# Patient Record
Sex: MALE | URBAN - METROPOLITAN AREA
[De-identification: names, ages, dates, MRNs, and addresses within clinical notes are randomized per-mention and may not be internally consistent; named-entity substitution may affect disease eponyms.]

---

## 2023-10-10 ENCOUNTER — APPOINTMENT (OUTPATIENT)
Dept: PRIMARY CARE | Facility: CLINIC | Age: 33
End: 2023-10-10
Payer: MEDICAID

## 2023-10-18 ENCOUNTER — OFFICE VISIT (OUTPATIENT)
Dept: PRIMARY CARE | Facility: CLINIC | Age: 33
End: 2023-10-18
Payer: MEDICAID

## 2023-10-18 VITALS — HEART RATE: 92 BPM | DIASTOLIC BLOOD PRESSURE: 52 MMHG | SYSTOLIC BLOOD PRESSURE: 96 MMHG

## 2023-10-18 DIAGNOSIS — Z79.4 TYPE 2 DIABETES MELLITUS WITHOUT COMPLICATION, WITH LONG-TERM CURRENT USE OF INSULIN (MULTI): ICD-10-CM

## 2023-10-18 DIAGNOSIS — Z00.00 HEALTH CARE MAINTENANCE: ICD-10-CM

## 2023-10-18 DIAGNOSIS — E44.1 MILD PROTEIN-CALORIE MALNUTRITION (MULTI): ICD-10-CM

## 2023-10-18 DIAGNOSIS — E11.9 TYPE 2 DIABETES MELLITUS WITHOUT COMPLICATION, WITHOUT LONG-TERM CURRENT USE OF INSULIN (MULTI): Primary | ICD-10-CM

## 2023-10-18 DIAGNOSIS — E11.9 TYPE 2 DIABETES MELLITUS WITHOUT COMPLICATION, WITH LONG-TERM CURRENT USE OF INSULIN (MULTI): ICD-10-CM

## 2023-10-18 PROCEDURE — 82570 ASSAY OF URINE CREATININE: CPT

## 2023-10-18 PROCEDURE — 87389 HIV-1 AG W/HIV-1&-2 AB AG IA: CPT

## 2023-10-18 PROCEDURE — 80349 CANNABINOIDS NATURAL: CPT

## 2023-10-18 PROCEDURE — 87800 DETECT AGNT MULT DNA DIREC: CPT

## 2023-10-18 PROCEDURE — 83036 HEMOGLOBIN GLYCOSYLATED A1C: CPT

## 2023-10-18 PROCEDURE — 86481 TB AG RESPONSE T-CELL SUSP: CPT

## 2023-10-18 PROCEDURE — 36415 COLL VENOUS BLD VENIPUNCTURE: CPT

## 2023-10-18 PROCEDURE — 99204 OFFICE O/P NEW MOD 45 MIN: CPT | Performed by: INTERNAL MEDICINE

## 2023-10-18 PROCEDURE — 80307 DRUG TEST PRSMV CHEM ANLYZR: CPT

## 2023-10-18 PROCEDURE — 85027 COMPLETE CBC AUTOMATED: CPT

## 2023-10-18 PROCEDURE — 86706 HEP B SURFACE ANTIBODY: CPT

## 2023-10-18 PROCEDURE — 4004F PT TOBACCO SCREEN RCVD TLK: CPT | Performed by: INTERNAL MEDICINE

## 2023-10-18 PROCEDURE — 81003 URINALYSIS AUTO W/O SCOPE: CPT

## 2023-10-18 PROCEDURE — 3074F SYST BP LT 130 MM HG: CPT | Performed by: INTERNAL MEDICINE

## 2023-10-18 PROCEDURE — 86803 HEPATITIS C AB TEST: CPT

## 2023-10-18 PROCEDURE — 82043 UR ALBUMIN QUANTITATIVE: CPT

## 2023-10-18 PROCEDURE — 3078F DIAST BP <80 MM HG: CPT | Performed by: INTERNAL MEDICINE

## 2023-10-18 PROCEDURE — 84443 ASSAY THYROID STIM HORMONE: CPT

## 2023-10-18 PROCEDURE — 86780 TREPONEMA PALLIDUM: CPT

## 2023-10-18 RX ORDER — INSULIN DETEMIR 100 [IU]/ML
45 INJECTION, SOLUTION SUBCUTANEOUS NIGHTLY
Qty: 10 ML | Refills: 1 | Status: SHIPPED | OUTPATIENT
Start: 2023-10-18 | End: 2024-10-17

## 2023-10-18 RX ORDER — INSULIN ASPART 100 [IU]/ML
12 INJECTION, SOLUTION INTRAVENOUS; SUBCUTANEOUS
Qty: 10 ML | Refills: 1 | Status: SHIPPED | OUTPATIENT
Start: 2023-10-18 | End: 2024-10-17

## 2023-10-18 ASSESSMENT — PATIENT HEALTH QUESTIONNAIRE - PHQ9
SUM OF ALL RESPONSES TO PHQ9 QUESTIONS 1 AND 2: 0
2. FEELING DOWN, DEPRESSED OR HOPELESS: NOT AT ALL
1. LITTLE INTEREST OR PLEASURE IN DOING THINGS: NOT AT ALL

## 2023-10-18 NOTE — PROGRESS NOTES
Subjective   Patient ID: Stefano Fernandes is a 33 y.o. male who presents for [facility].        HPI     Patient is a 33-year-old male with past medical history of diabetes mellitus type 2 insulin-dependent cachexia who presents to [facility].  It seems patient was living in [place] with his girlfriend.  He was diagnosed with diabetes mellitus type 2 2 years ago.  He has been at the [facility] and has been on regimen of insulin including Lantus 45 units nightly as well as 12 units of Humalog with meals.  He has been checking his sugars at home and consistently less than 200.  He has not had an A1c checked in a while.  He has been losing a substantial amount of weight.  His blood pressure is low at 96/52.  Has been taking lisinopril and atorvastatin.  He states over the last year has been becoming more weak and has difficulty standing.  He has not followed regularly with a physician.  No lab work currently available for evaluation.    He has no other medical problems.  He does not see an eye doctor nor does he have a podiatrist although he does report some numbness and tingling of the extremities.    Review of Systems  Constitutional: No fever or chills, No Night Sweats  Eyes: No Blurry Vision or Eye sight problems  ENT: No Nasal Discharge, Hoarseness, sore throat  Cardiovascular: no chest pain, no palpitations and no syncope.   Respiratory: no cough, no shortness of breath during exertion and no shortness of breath at rest.   Gastrointestinal: no abdominal pain, no nausea and no vomiting.   : No issues with urinary stream, burning with urination, no blood in urine or stools  Skin: No Skin rashes or Lesions  Neuro: No Headache, no dizziness or Numbness or tingling  Psych: No Anxiety, depression or sleeping problems  Heme: No Easy bleeding or brusing.     Objective   BP 96/52   Pulse 92     Physical Exam  Constitutional: Cachectic appearing and poor dentition.    Head and Face: Head and face: Normal.   "  Eyes: Normal external exam. Pupils were equal in size, round, reactive to light (PERRL) with normal accommodation and extraocular movements intact (EOMI).   Ears, Nose, Mouth, and Throat: External inspection of ears and nose: Normal.  Hearing: Normal.  Nasal mucosa, septum, and turbinates: Normal.  Lips, teeth, and gums: Normal.  Oropharynx: Normal.   Neck: No neck mass was observed. Supple. Thyroid not enlarged and there were no palpable thyroid nodules.   Cardiovascular: Heart rate and rhythm were normal, normal S1 and S2. Pedal pulses: Normal. No peripheral edema.   Pulmonary: No respiratory distress. Clear bilateral breath sounds.   Abdomen: Soft nontender; no abdominal mass palpated. Normal bowel sounds. No organomegaly.   : Deferred  Musculoskeletal: No joint swelling seen, normal movements of all extremities. Range of motion: Normal.  Muscle strength/tone: Normal.    Skin: Normal skin color and pigmentation, normal skin turgor, and no rash.   Neurologic: Deep tendon reflexes were 2+ and symmetric.   Psychiatric: Judgment and insight: Intact. Mood and affect: Normal.  Lymphatic: No cervical lymphadenopathy. Palpation of lymph nodes in axillae: Normal.  Palpation of lymph nodes in groin: Normal.    No results found for: \"WBC\", \"HGB\", \"HCT\", \"PLT\", \"CHOL\", \"TRIG\", \"HDL\", \"LDLDIRECT\", \"ALT\", \"AST\", \"NA\", \"K\", \"CL\", \"CREATININE\", \"BUN\", \"CO2\", \"TSH\", \"PSA\", \"INR\", \"GLUF\", \"HGBA1C\", \"ALBUR\"    No image results found.      Assessment/Plan   Problem List Items Addressed This Visit             ICD-10-CM    Type 2 diabetes mellitus without complication, without long-term current use of insulin (CMS/[facility]) - Primary E11.9    Relevant Medications    insulin detemir (Levemir FlexPen) 100 unit/mL (3 mL) pen    insulin aspart (NovoLOG FlexPen U-100 Insulin) 100 unit/mL (3 mL) pen    Other Relevant Orders    CK    C-reactive protein    Hemoglobin A1C    Albumin , Urine Random    TSH with reflex to Free T4 if abnormal    " Lipid Panel    CBC    Comprehensive metabolic panel    Follow Up In [facility] - PCP - Nurse Visit    Follow Up In [facility] - Pharmacy    Referral to Nutrition Services    Urinalysis with Reflex Microscopic    Drug Screen, Urine With Reflex to Confirmation    C. trachomatis / N. gonorrhoeae, DNA probe    Hepatitis B surface antibody    Hepatitis C Antibody    HIV 1/2 Antigen/Antibody Screen with Reflex to Confirmation    Syphilis Screen with Reflex    T-Spot TB    Mild protein-calorie malnutrition (CMS/HCC) E44.1    Relevant Orders    Drug Screen, Urine With Reflex to Confirmation    C. trachomatis / N. gonorrhoeae, DNA probe    Hepatitis B surface antibody    Hepatitis C Antibody    HIV 1/2 Antigen/Antibody Screen with Reflex to Confirmation    Syphilis Screen with Reflex    T-Spot TB     Other Visit Diagnoses         Codes    Type 2 diabetes mellitus without complication, with long-term current use of insulin (CMS/HCC)     E11.9, Z79.4    Relevant Medications    insulin detemir (Levemir FlexPen) 100 unit/mL (3 mL) pen    Other Relevant Orders    Referral to Nutrition Services    Urinalysis with Reflex Microscopic    Drug Screen, Urine With Reflex to Confirmation    C. trachomatis / N. gonorrhoeae, DNA probe    Hepatitis B surface antibody    Hepatitis C Antibody    HIV 1/2 Antigen/Antibody Screen with Reflex to Confirmation    Syphilis Screen with Reflex    T-Spot TB    Referral to Podiatry    Referral to Ophthalmology    Health care maintenance     Z00.00    Relevant Orders    C. trachomatis / N. gonorrhoeae, DNA probe    Hepatitis B surface antibody    Hepatitis C Antibody    HIV 1/2 Antigen/Antibody Screen with Reflex to Confirmation    Syphilis Screen with Reflex    T-Spot TB          At this point it is difficult to draw any conclusions about his care whether or not its the diabetes is causing his weight loss or if there is some other process going on.  At this time the prudent thing  to do would be to check some labs including urine drug screen STD panel T spot as well as A1c.  We will continue his current insulin regimen as is.  Will likely need to be changed to a more appropriate for a type II diabetic.  Will refer to nutritionist as well as the nurse and pharmacist to discuss which medications may be appropriate given his insurance.  We will have patient return to clinic in 2 to 3 weeks for reevaluation.  Continue checking your sugars with meals and document.  Advised low carbohydrate diet.  Deferring vaccines at this time.  If his A1c is significantly elevated may explain why he has been losing weight.  Further recommendations pending results of laboratory testing

## 2023-10-19 LAB
AMPHETAMINES UR QL SCN: ABNORMAL
APPEARANCE UR: CLEAR
BARBITURATES UR QL SCN: ABNORMAL
BENZODIAZ UR QL SCN: ABNORMAL
BILIRUB UR STRIP.AUTO-MCNC: NEGATIVE MG/DL
BZE UR QL SCN: ABNORMAL
C TRACH RRNA SPEC QL NAA+PROBE: NEGATIVE
CANNABINOIDS UR QL SCN: ABNORMAL
COLOR UR: COLORLESS
CREAT UR-MCNC: 9.5 MG/DL (ref 20–370)
ERYTHROCYTE [DISTWIDTH] IN BLOOD BY AUTOMATED COUNT: 11.8 % (ref 11.5–14.5)
EST. AVERAGE GLUCOSE BLD GHB EST-MCNC: 381 MG/DL
FENTANYL+NORFENTANYL UR QL SCN: ABNORMAL
GLUCOSE UR STRIP.AUTO-MCNC: ABNORMAL MG/DL
HBA1C MFR BLD: 14.9 %
HBV SURFACE AB SER-ACNC: 654.1 MIU/ML
HCT VFR BLD AUTO: 44.6 % (ref 41–52)
HCV AB SER QL: NONREACTIVE
HGB BLD-MCNC: 14.8 G/DL (ref 13.5–17.5)
HIV 1+2 AB+HIV1 P24 AG SERPL QL IA: NONREACTIVE
KETONES UR STRIP.AUTO-MCNC: NEGATIVE MG/DL
LEUKOCYTE ESTERASE UR QL STRIP.AUTO: NEGATIVE
MCH RBC QN AUTO: 31.9 PG (ref 26–34)
MCHC RBC AUTO-ENTMCNC: 33.2 G/DL (ref 32–36)
MCV RBC AUTO: 96 FL (ref 80–100)
MICROALBUMIN UR-MCNC: <7 MG/L
MICROALBUMIN/CREAT UR: ABNORMAL MG/G{CREAT}
N GONORRHOEA DNA SPEC QL PROBE+SIG AMP: NEGATIVE
NITRITE UR QL STRIP.AUTO: NEGATIVE
NRBC BLD-RTO: 0 /100 WBCS (ref 0–0)
OPIATES UR QL SCN: ABNORMAL
OXYCODONE+OXYMORPHONE UR QL SCN: ABNORMAL
PCP UR QL SCN: ABNORMAL
PH UR STRIP.AUTO: 6 [PH]
PLATELET # BLD AUTO: 226 X10*3/UL (ref 150–450)
PMV BLD AUTO: 10.8 FL (ref 7.5–11.5)
PROT UR STRIP.AUTO-MCNC: NEGATIVE MG/DL
RBC # BLD AUTO: 4.64 X10*6/UL (ref 4.5–5.9)
RBC # UR STRIP.AUTO: NEGATIVE /UL
SP GR UR STRIP.AUTO: 1.03
T PALLIDUM AB SER QL: NONREACTIVE
TSH SERPL-ACNC: 0.46 MIU/L (ref 0.44–3.98)
UROBILINOGEN UR STRIP.AUTO-MCNC: <2 MG/DL
WBC # BLD AUTO: 7 X10*3/UL (ref 4.4–11.3)

## 2023-10-20 DIAGNOSIS — E11.9 TYPE 2 DIABETES MELLITUS WITHOUT COMPLICATION, WITHOUT LONG-TERM CURRENT USE OF INSULIN (MULTI): Primary | ICD-10-CM

## 2023-10-20 DIAGNOSIS — Z71.1: ICD-10-CM

## 2023-10-20 NOTE — RESULT ENCOUNTER NOTE
Patient is advised to restart his diabetic regimen including his long-acting and mealtime insulin.  Please check your sugars on a regular basis first in the morning and call if your sugars are consistently greater than 200.  Also check your sugars before meals.  Please document.  Referral to endocrinology.  Follow-up in the office in 30 days

## 2023-10-21 LAB
NIL(NEG) CONTROL SPOT COUNT: ABNORMAL
PANEL A SPOT COUNT: 9
PANEL B SPOT COUNT: 25
POS CONTROL SPOT COUNT: ABNORMAL
T-SPOT. TB INTERPRETATION: POSITIVE

## 2023-10-23 NOTE — RESULT ENCOUNTER NOTE
Patient asymptomatic with no cough or shortness of breath or B symptoms.  He does have some weight loss which is more consistent with his uncontrolled diabetes.  Continuing the positive test will refer to infectious disease as well as check chest x-ray.  Advised to go to the x-ray department today to have it done.  Please schedule the appointment both endocrinology and infectious disease to rule out TB

## 2023-10-24 DIAGNOSIS — Z00.00 HEALTH CARE MAINTENANCE: Primary | ICD-10-CM

## 2023-10-24 LAB — CARBOXYTHC UR-MCNC: 25 NG/ML

## 2023-12-01 ENCOUNTER — APPOINTMENT (OUTPATIENT)
Dept: INFECTIOUS DISEASES | Facility: CLINIC | Age: 33
End: 2023-12-01
Payer: MEDICAID

## 2023-12-05 ENCOUNTER — PATIENT OUTREACH (OUTPATIENT)
Dept: PRIMARY CARE | Facility: CLINIC | Age: 33
End: 2023-12-05
Payer: MEDICAID

## 2023-12-05 NOTE — PROGRESS NOTES
Discharge Facility: [facility]  Discharge Diagnosis: Diabetic ketoacidosis without coma associated with type 2 diabetes mellitus   Admission Date: 11-  Discharge Date: 12-1-2023    PCP Appointment Date: Unsuccessful outreach / request to schedule sent to office.     Specialist Appointment Date:   - Endocrinology -Please call the Endocrinology Clinic at [phone] to schedule an appointment if one was not made for you today.       Hospital Encounter and Summary: Linked   See discharge assessment below for further details

## 2023-12-26 RX ORDER — LISINOPRIL 10 MG/1
10 TABLET ORAL DAILY
COMMUNITY
Start: 2023-09-11

## 2023-12-26 RX ORDER — METFORMIN HYDROCHLORIDE 500 MG/1
500 TABLET, EXTENDED RELEASE ORAL
COMMUNITY
Start: 2023-12-01

## 2023-12-26 RX ORDER — ATORVASTATIN CALCIUM 10 MG/1
10 TABLET, FILM COATED ORAL DAILY
COMMUNITY
Start: 2023-09-11

## 2023-12-26 RX ORDER — INSULIN GLARGINE 100 [IU]/ML
20 INJECTION, SOLUTION SUBCUTANEOUS
COMMUNITY
Start: 2023-12-01 | End: 2024-07-13